# Patient Record
Sex: FEMALE | Employment: UNEMPLOYED | URBAN - METROPOLITAN AREA
[De-identification: names, ages, dates, MRNs, and addresses within clinical notes are randomized per-mention and may not be internally consistent; named-entity substitution may affect disease eponyms.]

---

## 2021-08-25 ENCOUNTER — OFFICE VISIT (OUTPATIENT)
Dept: PRIMARY CARE CLINIC | Age: 21
End: 2021-08-25
Payer: COMMERCIAL

## 2021-08-25 VITALS
OXYGEN SATURATION: 98 % | TEMPERATURE: 97.1 F | DIASTOLIC BLOOD PRESSURE: 78 MMHG | BODY MASS INDEX: 41.91 KG/M2 | HEART RATE: 73 BPM | RESPIRATION RATE: 16 BRPM | SYSTOLIC BLOOD PRESSURE: 117 MMHG | HEIGHT: 66 IN | WEIGHT: 260.8 LBS

## 2021-08-25 DIAGNOSIS — E55.9 VITAMIN D DEFICIENCY: ICD-10-CM

## 2021-08-25 DIAGNOSIS — E04.0 GOITER DIFFUSE: ICD-10-CM

## 2021-08-25 DIAGNOSIS — H91.93 DECREASED HEARING OF BOTH EARS: Primary | ICD-10-CM

## 2021-08-25 DIAGNOSIS — Z11.59 NEED FOR HEPATITIS C SCREENING TEST: ICD-10-CM

## 2021-08-25 DIAGNOSIS — Z00.00 PHYSICAL EXAM: ICD-10-CM

## 2021-08-25 DIAGNOSIS — E66.01 MORBIDLY OBESE (HCC): ICD-10-CM

## 2021-08-25 LAB
25(OH)D3 SERPL-MCNC: 39 NG/ML (ref 30–100)
ALBUMIN SERPL-MCNC: 4 G/DL (ref 3.5–5)
ALBUMIN/GLOB SERPL: 1.2 {RATIO} (ref 1.1–2.2)
ALP SERPL-CCNC: 77 U/L (ref 45–117)
ALT SERPL-CCNC: 21 U/L (ref 12–78)
ANION GAP SERPL CALC-SCNC: 2 MMOL/L (ref 5–15)
AST SERPL-CCNC: 12 U/L (ref 15–37)
BILIRUB SERPL-MCNC: 1 MG/DL (ref 0.2–1)
BUN SERPL-MCNC: 11 MG/DL (ref 6–20)
BUN/CREAT SERPL: 17 (ref 12–20)
CALCIUM SERPL-MCNC: 9.3 MG/DL (ref 8.5–10.1)
CHLORIDE SERPL-SCNC: 108 MMOL/L (ref 97–108)
CHOLEST SERPL-MCNC: 108 MG/DL
CO2 SERPL-SCNC: 29 MMOL/L (ref 21–32)
CREAT SERPL-MCNC: 0.65 MG/DL (ref 0.55–1.02)
ERYTHROCYTE [DISTWIDTH] IN BLOOD BY AUTOMATED COUNT: 12.2 % (ref 11.5–14.5)
GLOBULIN SER CALC-MCNC: 3.4 G/DL (ref 2–4)
GLUCOSE SERPL-MCNC: 89 MG/DL (ref 65–100)
HCT VFR BLD AUTO: 37.4 % (ref 35–47)
HCV AB SERPL QL IA: NONREACTIVE
HDLC SERPL-MCNC: 59 MG/DL
HDLC SERPL: 1.8 {RATIO} (ref 0–5)
HGB BLD-MCNC: 12.4 G/DL (ref 11.5–16)
LDLC SERPL CALC-MCNC: 43.2 MG/DL (ref 0–100)
MCH RBC QN AUTO: 30.7 PG (ref 26–34)
MCHC RBC AUTO-ENTMCNC: 33.2 G/DL (ref 30–36.5)
MCV RBC AUTO: 92.6 FL (ref 80–99)
NRBC # BLD: 0 K/UL (ref 0–0.01)
NRBC BLD-RTO: 0 PER 100 WBC
PLATELET # BLD AUTO: 210 K/UL (ref 150–400)
PMV BLD AUTO: 11.5 FL (ref 8.9–12.9)
POTASSIUM SERPL-SCNC: 4.2 MMOL/L (ref 3.5–5.1)
PROT SERPL-MCNC: 7.4 G/DL (ref 6.4–8.2)
RBC # BLD AUTO: 4.04 M/UL (ref 3.8–5.2)
SODIUM SERPL-SCNC: 139 MMOL/L (ref 136–145)
TRIGL SERPL-MCNC: 29 MG/DL (ref ?–150)
TSH SERPL DL<=0.05 MIU/L-ACNC: 0.47 UIU/ML (ref 0.36–3.74)
VLDLC SERPL CALC-MCNC: 5.8 MG/DL
WBC # BLD AUTO: 5.1 K/UL (ref 3.6–11)

## 2021-08-25 PROCEDURE — 99204 OFFICE O/P NEW MOD 45 MIN: CPT | Performed by: INTERNAL MEDICINE

## 2021-08-25 RX ORDER — ASCORBIC ACID 250 MG
TABLET ORAL
COMMUNITY

## 2021-08-25 RX ORDER — GLUCOSAMINE SULFATE 1500 MG
POWDER IN PACKET (EA) ORAL DAILY
COMMUNITY

## 2021-08-25 RX ORDER — ZINC GLUCONATE 10 MG
LOZENGE ORAL
COMMUNITY

## 2021-08-25 NOTE — PROGRESS NOTES
Identified pt with two pt identifiers(name and ). Chief Complaint   Patient presents with   [address] Other     wants labs to check thyoid; has previous diagnosis of abnormal thyroid labs approx. 1-2 years ago        3 most recent PHQ Screens 2021   Little interest or pleasure in doing things Not at all   Feeling down, depressed, irritable, or hopeless Not at all   Total Score PHQ 2 0        Vitals:    21 1033   BP: 117/78   Pulse: 73   Resp: 16   Temp: 97.1 °F (36.2 °C)   TempSrc: Temporal   SpO2: 98%   Weight: 260 lb 12.8 oz (118.3 kg)   Height: 5' 6\" (1.676 m)   PainSc:   0 - No pain   LMP: 2021       Health Maintenance Due   Topic    Hepatitis C Screening     HPV Age 9Y-34Y (1 - 2-dose series)    COVID-19 Vaccine (1)       1. Have you been to the ER, urgent care clinic since your last visit? Hospitalized since your last visit? No    2. Have you seen or consulted any other health care providers outside of the [address] [place] [address] since your last visit? Include any pap smears or colon screening.  No

## 2021-08-25 NOTE — PROGRESS NOTES
Patito Ramirez (: 2000) is a 22 y.o. female, new patient, here for evaluation of the following chief complaint(s):  Establish Care, Other (wants labs to check thyoid; has previous diagnosis of abnormal thyroid labs approx. 1-2 years ago), and Ear Fullness (no pain, feeling full for about 1 year)     Written by Juan Bueno, as dictated by Dr. Mohsen Mejia MD.      ASSESSMENT/PLAN:  Below is the assessment and plan developed based on review of pertinent history, physical exam, labs, studies, and medications. 1. Decreased hearing of both ears  Referred to ENT. -     REFERRAL TO ENT-OTOLARYNGOLOGY    2. Goiter diffuse  Ordered a thyroid ultrasound to be completed. Waiting for results. -     US THYROID/PARATHYROID/SOFT TISS; Future    3. Physical exam  Complete physical done today. Routine lab work ordered. Waiting for results. -     METABOLIC PANEL, COMPREHENSIVE; Future  -     CBC W/O DIFF; Future  -     LIPID PANEL; Future  -     TSH 3RD GENERATION; Future    4. Morbidly obese ([address]  Advised pt to purchase a FitBit or similar device. Discussed that a healthy lifestyle requires 5,000-7,000 steps daily, but weight loss requires 10,000 steps daily. 5. Need for hepatitis C screening test  Ordered Hepatitis C test. Waiting for results.  -     HEPATITIS C AB; Future    6. Vitamin D deficiency  Ordered lab work to check Vitamin D levels. Waiting for results. Recommend that patient take a Vitamin D supplement of 1,000 units daily. -     VITAMIN D, 25 HYDROXY; Future    SUBJECTIVE/OBJECTIVE:  HPI     Patient presents today to establish care and requesting a complete physical exam. She was previously followed by Dr. Jose Carias, Internal Medicine. She has a dx'd of goiter neck. She has completed a thyroid ultrasound in the past but not recently. She c/o BL ear fullness and hearing loss. Her weight has changed from 233 lbs in 2016 to 260 lbs today. Her menstrual cycle is regular. She has mild back pain during her cycle but denies any headaches. She notes occasional constipation. She denies any seasonal allergies but notes a mild food allergy to shrimp. Patient Active Problem List   Diagnosis Code    Bad breath R19.6    BMI (body mass index), pediatric, greater than or equal to 95% for age Z71.50        Current Outpatient Medications on File Prior to Visit   Medication Sig Dispense Refill    magnesium 250 mg tab Take  by mouth.  ascorbic acid, vitamin C, (Vitamin C) 250 mg tablet Take  by mouth.  cholecalciferol (Vitamin D3) 25 mcg (1,000 unit) cap Take  by mouth daily.  ZINC ACETATE PO Take  by mouth.  fluticasone (FLONASE) 50 mcg/actuation nasal spray 2 Sprays by Both Nostrils route daily. (Patient not taking: Reported on 8/25/2021) 1 Bottle 2    loratadine (CLARITIN) 10 mg tablet Take 1 Tab by mouth daily. (Patient not taking: Reported on 8/25/2021) 30 Tab 2    benzoyl peroxide-erythromycin (BENZAMYCIN) 3-5 % topical gel Apply  to affected area two (2) times a day. (Patient not taking: Reported on 8/25/2021) 46.6 g 5     No current facility-administered medications on file prior to visit. Allergies   Allergen Reactions    Shrimp Nausea Only       Past Medical History:   Diagnosis Date    BMI (body mass index), pediatric, greater than or equal to 95% for age 8/30/2013    Rash 2/22/2011       History reviewed. No pertinent surgical history. History reviewed. No pertinent family history.     Social History     Socioeconomic History    Marital status: SINGLE     Spouse name: Not on file    Number of children: Not on file    Years of education: Not on file    Highest education level: Not on file   Occupational History    Not on file   Tobacco Use    Smoking status: Never Smoker    Smokeless tobacco: Never Used   Vaping Use    Vaping Use: Never used   Substance and Sexual Activity    Alcohol use: No    Drug use: No    Sexual activity: Not on file   Other Topics Concern    Not on file   Social History Narrative    Not on file     Social Determinants of Health     Financial Resource Strain:     Difficulty of Paying Living Expenses:    Food Insecurity:     Worried About Running Out of Food in the Last Year:     [address] in the Last Year:    Transportation Needs:     Lack of Transportation (Medical):  Lack of Transportation (Non-Medical):    Physical Activity:     Days of Exercise per Week:     Minutes of Exercise per Session:    Stress:     Feeling of Stress :    Social Connections:     Frequency of Communication with Friends and Family:     Frequency of Social Gatherings with Friends and Family:     Attends Baptism Services:     Active Member of Clubs or Organizations:     Attends Club or Organization Meetings:     Marital Status:    Intimate Partner Violence:     Fear of Current or Ex-Partner:     Emotionally Abused:     Physically Abused:     Sexually Abused:        No visits with results within 3 Month(s) from this visit. Latest known visit with results is:   Office Visit on 08/30/2013   Component Date Value Ref Range Status    WBC 08/30/2013 4.8  4.0 - 9.1 x10E3/uL Final    Comment: **Effective September 9, 2013, the reference intervals for CBC:WBC,**                             Differential Parameters (Neutrophil %, Neutrophil Absolute,                             Lymphocyte %, Lymphocyte Absolute, Monocyte %, Monocyte Absolute,                             Eosinophil %, Eosinophil Absolute), and Platelet Counts will be                             adjusted to maintain consistency with the distribution of these                             values in the reference population.     RBC 08/30/2013 4.12  3.91 - 5.45 x10E6/uL Final    HGB 08/30/2013 12.4  11.7 - 15.7 g/dL Final    HCT 08/30/2013 37.5  34.8 - 45.8 % Final    MCV 08/30/2013 91  77 - 91 fL Final    MCH 08/30/2013 30.1  25.7 - 31.5 pg Final    [facility] 08/30/2013 33.1  31.7 - 36.0 g/dL Final    RDW 08/30/2013 12.7  12.3 - 15.1 % Final    PLATELET 27/69/6929 716  150 - 349 x10E3/uL Final    Comment: **Effective September 9, 2013, the reference intervals for CBC:WBC,**                             Differential Parameters (Neutrophil %, Neutrophil Absolute,                             Lymphocyte %, Lymphocyte Absolute, Monocyte %, Monocyte Absolute,                             Eosinophil %, Eosinophil Absolute), and Platelet Counts will be                             adjusted to maintain consistency with the distribution of these                             values in the reference population.  NEUTROPHILS 08/30/2013 50  40 - 70 % Final    Lymphocytes 08/30/2013 43  20 - 47 % Final    MONOCYTES 08/30/2013 6  3 - 10 % Final    EOSINOPHILS 08/30/2013 1  0 - 4 % Final    BASOPHILS 08/30/2013 0  0 - 2 % Final    ABS. NEUTROPHILS 08/30/2013 2.4  1.5 - 5.6 x10E3/uL Final    Abs Lymphocytes 08/30/2013 2.0  1.1 - 3.1 x10E3/uL Final    ABS. MONOCYTES 08/30/2013 0.3  0.1 - 0.7 x10E3/uL Final    ABS. EOSINOPHILS 08/30/2013 0.1  0.0 - 0.4 x10E3/uL Final    ABS. BASOPHILS 08/30/2013 0.0  0.0 - 0.3 x10E3/uL Final    IMMATURE GRANULOCYTES 08/30/2013 0  0 - 2 % Final    ABS. IMM. GRANS. 08/30/2013 0.0  0.0 - 0.1 x10E3/uL Final    Cholesterol, total 08/30/2013 102  100 - 169 mg/dL Final    Triglyceride 08/30/2013 40  0 - 89 mg/dL Final    HDL Cholesterol 08/30/2013 51  >39 mg/dL Final    Comment: According to ATP-III Guidelines, HDL-C >59 mg/dL is considered a                           negative risk factor for CHD.     VLDL, calculated 08/30/2013 8  5 - 40 mg/dL Final    LDL, calculated 08/30/2013 43  0 - 109 mg/dL Final    Glucose 08/30/2013 78  65 - 99 mg/dL Final    BUN 08/30/2013 6  5 - 18 mg/dL Final    Creatinine 08/30/2013 0.49  0.42 - 0.75 mg/dL Final    BUN/Creatinine ratio 08/30/2013 12  9 - 25 Final    Sodium 08/30/2013 139  134 - 144 mmol/L Final  Potassium 08/30/2013 3.7  3.5 - 5.2 mmol/L Final    Chloride 08/30/2013 103  97 - 108 mmol/L Final    CO2 08/30/2013 21  17 - 26 mmol/L Final    Calcium 08/30/2013 9.7  8.9 - 10.4 mg/dL Final    Protein, total 08/30/2013 6.7  6.0 - 8.5 g/dL Final    Albumin 08/30/2013 4.0  3.5 - 5.5 g/dL Final    GLOBULIN, TOTAL 08/30/2013 2.7  1.5 - 4.5 g/dL Final    A-G Ratio 08/30/2013 1.5  1.1 - 2.5 Final    Bilirubin, total 08/30/2013 1.0  0.0 - 1.2 mg/dL Final    Alk. phosphatase 08/30/2013 241  134 - 349 IU/L Final    AST (SGOT) 08/30/2013 20  0 - 40 IU/L Final    ALT (SGPT) 08/30/2013 16  0 - 24 IU/L Final    Hemoglobin A1c 08/30/2013 5.3  4.8 - 5.6 % Final    Comment:          Increased risk for diabetes: 5.7 - 6.4                                    Diabetes: >6.4                                    Glycemic control for adults with diabetes: <7.0    T4, Free 08/30/2013 1.10  0.93 - 1.60 ng/dL Final    TSH 08/30/2013 0.848  0.450 - 4.500 uIU/mL Final    Comment: Performed At: [address]  [id number]                           Kmi Ohara MD                           [phone]       Review of Systems   Constitutional: Negative for activity change, fatigue and unexpected weight change. HENT: Positive for congestion (BL ear fullness) and hearing loss. Negative for rhinorrhea and sore throat. Eyes: Negative for discharge. Respiratory: Negative for cough, chest tightness and shortness of breath. Cardiovascular: Negative for leg swelling. Gastrointestinal: Positive for constipation. Negative for abdominal pain and diarrhea. Genitourinary: Negative for dysuria, flank pain, frequency and urgency. Musculoskeletal: Positive for back pain. Negative for arthralgias and myalgias. Skin: Negative for color change and rash. Allergic/Immunologic: Positive for food allergies. Neurological: Negative for dizziness, light-headedness and headaches. Psychiatric/Behavioral: Negative for dysphoric mood and sleep disturbance. The patient is not nervous/anxious. Visit Vitals  /78 (BP 1 Location: Left upper arm, BP Patient Position: Sitting, BP Cuff Size: Adult long)   Pulse 73   Temp 97.1 °F (36.2 °C) (Temporal)   Resp 16   Ht 5' 6\" (1.676 m)   Wt 260 lb 12.8 oz (118.3 kg)   LMP 08/09/2021 (Exact Date)   SpO2 98%   BMI 42.09 kg/m²       Physical Exam  Vitals and nursing note reviewed. Constitutional:       General: She is not in acute distress. Appearance: Normal appearance. She is normal weight. She is not diaphoretic. HENT:      Right Ear: Tympanic membrane, ear canal and external ear normal.      Left Ear: Tympanic membrane, ear canal and external ear normal.      Mouth/Throat:      Mouth: Mucous membranes are moist.      Pharynx: Oropharynx is clear. Eyes:      General:         Right eye: No discharge. Left eye: No discharge. Extraocular Movements: Extraocular movements intact. Conjunctiva/sclera: Conjunctivae normal.   Neck:      Thyroid: Thyromegaly present. Comments: Goiter neck  Cardiovascular:      Rate and Rhythm: Normal rate and regular rhythm. Pulses: Normal pulses. Dorsalis pedis pulses are 2+ on the right side and 2+ on the left side. Pulmonary:      Effort: Pulmonary effort is normal.      Breath sounds: Normal breath sounds. No wheezing. Abdominal:      General: Bowel sounds are normal. There is no distension. Palpations: Abdomen is soft. Tenderness: There is no abdominal tenderness. Musculoskeletal:      Right lower leg: No edema. Left lower leg: No edema. Comments: B/L knees without crepitus   Lymphadenopathy:      Cervical: No cervical adenopathy.    Neurological:      Deep Tendon Reflexes:      Reflex Scores:       Patellar reflexes are 2+ on the right side and 2+ on the left side.  Psychiatric:         Mood and Affect: Mood and affect normal.             An electronic signature was used to authenticate this note.   -- Elen Leahy

## 2021-08-26 ENCOUNTER — HOSPITAL ENCOUNTER (OUTPATIENT)
Dept: ULTRASOUND IMAGING | Age: 21
Discharge: HOME | End: 2021-08-26
Attending: INTERNAL MEDICINE
Payer: COMMERCIAL

## 2021-08-26 DIAGNOSIS — E04.0 GOITER DIFFUSE: ICD-10-CM

## 2021-08-26 PROCEDURE — 76536 US EXAM OF HEAD AND NECK: CPT

## 2021-08-30 DIAGNOSIS — E04.2 MULTIPLE THYROID NODULES: Primary | ICD-10-CM

## 2021-08-30 NOTE — PROGRESS NOTES
Results reviewed. Release via [facility], here is the number of ENT.   Elle Fountain, [address]  [address] [facility] [address]     Phone:  [phone]

## 2022-01-05 ENCOUNTER — TELEPHONE (OUTPATIENT)
Dept: PRIMARY CARE CLINIC | Age: 22
End: 2022-01-05

## 2022-01-05 NOTE — TELEPHONE ENCOUNTER
Spoke with patient's mom.  She wanted name and number for the referral she was give back in July 2021

## 2022-01-05 NOTE — TELEPHONE ENCOUNTER
----- Message from [facility] sent at 1/5/2022 10:53 AM EST -----  Subject: Message to Provider    QUESTIONS  Information for Provider? Would like a call to discuss next steps for her   treatment   ---------------------------------------------------------------------------  --------------  CALL BACK INFO  What is the best way for the office to contact you? OK to leave message on   voicemail  Preferred Call Back Phone Number? [phone]  ---------------------------------------------------------------------------  --------------  SCRIPT ANSWERS  Relationship to Patient? Parent  Representative Name? Alisia  Patient is under 25 and the Parent has custody? No  Is the Representative on the appropriate HIPAA document in Epic?  Yes

## 2022-01-26 ENCOUNTER — TRANSCRIBE ORDER (OUTPATIENT)
Dept: SCHEDULING | Age: 22
End: 2022-01-26

## 2022-01-26 DIAGNOSIS — R22.1 NECK MASS: Primary | ICD-10-CM

## 2023-05-08 ENCOUNTER — TELEPHONE (OUTPATIENT)
Dept: PRIMARY CARE CLINIC | Facility: CLINIC | Age: 23
End: 2023-05-08

## 2023-05-08 NOTE — TELEPHONE ENCOUNTER
Called and spoke with the patient at [phone]. Patient stated that she is asking for Dr Claudine Evangelista to do a letter so that she can get her financial aid/money back in regards to her classes. Stated she isn't doing that great this semester from [employer] work and yeah. Zhanna Espinoza \" I told her that we do not have and Dx of concentration issues. No studies on file for evaluation. Patient would need to have an appointment for the letter however I did state that I don't thing Dr Claudine Evangelista will do this as this needs eval for psych if never been dx before. Cannot see patient this week for this as of right now.  Told her I will forward the message along and let her know

## 2023-05-08 NOTE — TELEPHONE ENCOUNTER
Called and spoke with both patient and mother, she needs to have an appointment for this. Scheduled for Monday 5/15. Patient needing to be in office, not seen since 2021. Told them both that we do not have documentation of having concentration problems and that for a letter the patient will need to be seen.

## 2023-05-22 RX ORDER — FLUTICASONE PROPIONATE 50 MCG
2 SPRAY, SUSPENSION (ML) NASAL DAILY
COMMUNITY
Start: 2016-09-28

## 2023-05-22 RX ORDER — LORATADINE 10 MG/1
10 TABLET ORAL DAILY
COMMUNITY
Start: 2016-09-28

## 2023-05-22 RX ORDER — ERYTHROMYCIN AND BENZOYL PEROXIDE 30; 50 MG/G; MG/G
GEL TOPICAL 2 TIMES DAILY
COMMUNITY
Start: 2013-08-30

## 2023-05-22 RX ORDER — ASCORBIC ACID 250 MG
TABLET ORAL
COMMUNITY

## 2023-05-31 ENCOUNTER — OFFICE VISIT (OUTPATIENT)
Dept: PRIMARY CARE CLINIC | Facility: CLINIC | Age: 23
End: 2023-05-31
Payer: COMMERCIAL

## 2023-05-31 VITALS
HEIGHT: 66 IN | SYSTOLIC BLOOD PRESSURE: 106 MMHG | BODY MASS INDEX: 39.86 KG/M2 | HEART RATE: 70 BPM | RESPIRATION RATE: 18 BRPM | OXYGEN SATURATION: 95 % | TEMPERATURE: 97.1 F | WEIGHT: 248 LBS | DIASTOLIC BLOOD PRESSURE: 67 MMHG

## 2023-05-31 DIAGNOSIS — R53.83 OTHER FATIGUE: ICD-10-CM

## 2023-05-31 DIAGNOSIS — E04.1 THYROID NODULE: Primary | ICD-10-CM

## 2023-05-31 PROCEDURE — 99213 OFFICE O/P EST LOW 20 MIN: CPT | Performed by: INTERNAL MEDICINE

## 2023-05-31 SDOH — ECONOMIC STABILITY: HOUSING INSECURITY
IN THE LAST 12 MONTHS, WAS THERE A TIME WHEN YOU DID NOT HAVE A STEADY PLACE TO SLEEP OR SLEPT IN A SHELTER (INCLUDING NOW)?: NO

## 2023-05-31 SDOH — ECONOMIC STABILITY: FOOD INSECURITY: WITHIN THE PAST 12 MONTHS, YOU WORRIED THAT YOUR FOOD WOULD RUN OUT BEFORE YOU GOT MONEY TO BUY MORE.: PATIENT DECLINED

## 2023-05-31 SDOH — ECONOMIC STABILITY: INCOME INSECURITY: HOW HARD IS IT FOR YOU TO PAY FOR THE VERY BASICS LIKE FOOD, HOUSING, MEDICAL CARE, AND HEATING?: PATIENT DECLINED

## 2023-05-31 SDOH — ECONOMIC STABILITY: FOOD INSECURITY: WITHIN THE PAST 12 MONTHS, THE FOOD YOU BOUGHT JUST DIDN'T LAST AND YOU DIDN'T HAVE MONEY TO GET MORE.: PATIENT DECLINED

## 2023-05-31 ASSESSMENT — PATIENT HEALTH QUESTIONNAIRE - PHQ9
SUM OF ALL RESPONSES TO PHQ QUESTIONS 1-9: 0
2. FEELING DOWN, DEPRESSED OR HOPELESS: 0
SUM OF ALL RESPONSES TO PHQ9 QUESTIONS 1 & 2: 0
SUM OF ALL RESPONSES TO PHQ QUESTIONS 1-9: 0
1. LITTLE INTEREST OR PLEASURE IN DOING THINGS: 0
SUM OF ALL RESPONSES TO PHQ QUESTIONS 1-9: 0
SUM OF ALL RESPONSES TO PHQ QUESTIONS 1-9: 0

## 2023-05-31 ASSESSMENT — ENCOUNTER SYMPTOMS
CHEST TIGHTNESS: 0
SHORTNESS OF BREATH: 0
ABDOMINAL PAIN: 0
SORE THROAT: 0
BACK PAIN: 0
COUGH: 0
DIARRHEA: 0
RHINORRHEA: 0
CONSTIPATION: 0
EYE DISCHARGE: 0
COLOR CHANGE: 0

## 2023-05-31 NOTE — PROGRESS NOTES
1. \"Have you been to the ER, urgent care clinic since your last visit? Hospitalized since your last visit? \" No    2. \"Have you seen or consulted any other health care providers outside of the [address] [place] [address] since your last visit? \" Yes When: ENDO      3. For patients aged 39-70: Has the patient had a colonoscopy / FIT/ Cologuard? NA - based on age      If the patient is female:    4. For patients aged 41-77: Has the patient had a mammogram within the past 2 years? NA - based on age or sex      11. For patients aged 21-65: Has the patient had a pap smear? No     .  Chief Complaint   Patient presents with    Follow-up    Letter for School/Work     Wants a letter for her school stating all the medical treatment she has been getting since Aug-Dec 2022 she was going to a lot of doctors one of them being an Endocrinologist that Dr. Jacek Daniels sent her too. She was having symptoms of thyroid and had a biopsy done. Pt said everything is normal now with thyroid. Her last visit with Dr. Jacek Daniels is 8/25/2021. Pt is ok with scribe.
Cervical back: Normal range of motion and neck supple. Lymphadenopathy:      Cervical: No cervical adenopathy. Neurological:      Mental Status: She is alert and oriented to person, place, and time. Psychiatric:         Mood and Affect: Mood normal.         I, Krissy Silver MD, personally performed the services described in this documentation as scribed by Rosette Del Cid in my presence, and it is both accurate and complete. An electronic signature was used to authenticate this note.   -- Rosette Del Cid

## 2023-06-01 ENCOUNTER — HOSPITAL ENCOUNTER (OUTPATIENT)
Age: 23
Discharge: HOME | End: 2023-06-01
Payer: COMMERCIAL

## 2023-06-01 DIAGNOSIS — E04.1 THYROID NODULE: ICD-10-CM

## 2023-06-01 PROCEDURE — 76536 US EXAM OF HEAD AND NECK: CPT

## 2024-08-27 ENCOUNTER — OFFICE VISIT (OUTPATIENT)
Dept: PRIMARY CARE CLINIC | Facility: CLINIC | Age: 24
End: 2024-08-27
Payer: COMMERCIAL

## 2024-08-27 VITALS
HEART RATE: 73 BPM | DIASTOLIC BLOOD PRESSURE: 71 MMHG | OXYGEN SATURATION: 98 % | TEMPERATURE: 97 F | SYSTOLIC BLOOD PRESSURE: 109 MMHG | HEIGHT: 66 IN | RESPIRATION RATE: 18 BRPM | BODY MASS INDEX: 39.37 KG/M2 | WEIGHT: 245 LBS

## 2024-08-27 DIAGNOSIS — B37.0 ORAL THRUSH: ICD-10-CM

## 2024-08-27 DIAGNOSIS — E04.1 THYROID NODULE: Primary | ICD-10-CM

## 2024-08-27 DIAGNOSIS — E04.1 THYROID NODULE: ICD-10-CM

## 2024-08-27 DIAGNOSIS — Z00.00 PHYSICAL EXAM: ICD-10-CM

## 2024-08-27 DIAGNOSIS — Z11.4 ENCOUNTER FOR SCREENING FOR HIV: ICD-10-CM

## 2024-08-27 PROCEDURE — 99214 OFFICE O/P EST MOD 30 MIN: CPT | Performed by: INTERNAL MEDICINE

## 2024-08-27 RX ORDER — DOXYCYCLINE 100 MG/1
100 CAPSULE ORAL DAILY
COMMUNITY
Start: 2024-08-21

## 2024-08-27 RX ORDER — NYSTATIN 100000/ML
500000 SUSPENSION, ORAL (FINAL DOSE FORM) ORAL 3 TIMES DAILY
Qty: 150 ML | Refills: 0 | Status: SHIPPED | OUTPATIENT
Start: 2024-08-27 | End: 2024-09-06

## 2024-08-27 SDOH — ECONOMIC STABILITY: FOOD INSECURITY: WITHIN THE PAST 12 MONTHS, YOU WORRIED THAT YOUR FOOD WOULD RUN OUT BEFORE YOU GOT MONEY TO BUY MORE.: NEVER TRUE

## 2024-08-27 SDOH — ECONOMIC STABILITY: FOOD INSECURITY: WITHIN THE PAST 12 MONTHS, THE FOOD YOU BOUGHT JUST DIDN'T LAST AND YOU DIDN'T HAVE MONEY TO GET MORE.: NEVER TRUE

## 2024-08-27 SDOH — ECONOMIC STABILITY: INCOME INSECURITY: HOW HARD IS IT FOR YOU TO PAY FOR THE VERY BASICS LIKE FOOD, HOUSING, MEDICAL CARE, AND HEATING?: SOMEWHAT HARD

## 2024-08-27 ASSESSMENT — PATIENT HEALTH QUESTIONNAIRE - PHQ9
SUM OF ALL RESPONSES TO PHQ QUESTIONS 1-9: 0
1. LITTLE INTEREST OR PLEASURE IN DOING THINGS: NOT AT ALL
2. FEELING DOWN, DEPRESSED OR HOPELESS: NOT AT ALL
SUM OF ALL RESPONSES TO PHQ9 QUESTIONS 1 & 2: 0
SUM OF ALL RESPONSES TO PHQ QUESTIONS 1-9: 0

## 2024-08-27 ASSESSMENT — ENCOUNTER SYMPTOMS
RHINORRHEA: 0
COUGH: 0
DIARRHEA: 0
EYE DISCHARGE: 0
SORE THROAT: 0
CHEST TIGHTNESS: 0
ABDOMINAL PAIN: 0
SHORTNESS OF BREATH: 0
BACK PAIN: 0
COLOR CHANGE: 0
CONSTIPATION: 0

## 2024-08-27 NOTE — PROGRESS NOTES
Running Out of Food in the Last Year: Never true     Ran Out of Food in the Last Year: Never true   Transportation Needs: Unknown (8/27/2024)    PRAPARE - Transportation     Lack of Transportation (Medical): Not on file     Lack of Transportation (Non-Medical): No   Physical Activity: Not on file   Stress: Not on file   Social Connections: Not on file   Intimate Partner Violence: Not on file   Housing Stability: Unknown (8/27/2024)    Housing Stability Vital Sign     Unable to Pay for Housing in the Last Year: Not on file     Number of Times Moved in the Last Year: Not on file     Homeless in the Last Year: No       No visits with results within 3 Month(s) from this visit.   Latest known visit with results is:   Office Visit on 08/25/2021   Component Date Value Ref Range Status    Vit D, 25-Hydroxy 08/25/2021 39.0  30 - 100 ng/mL Final    Comment: (NOTE)  Deficiency               <20 ng/mL  Insufficiency          20-30 ng/mL  Sufficient             ng/mL  Possible toxicity       >100 ng/mL    The Method used is Siemens Advia Centaur currently standardized to a   Center of Disease Control and Prevention (CDC) certified reference   method. Samples containing fluorescein dye can produce falsely   elevated values when tested with the ADVIA Centaur Vitamin D Assay.   It is recommended that results in the toxic range, >100 ng/mL, be   retested 72 hours post fluorescein exposure.      WBC 08/25/2021 5.1  3.6 - 11.0 K/uL Final    RBC 08/25/2021 4.04  3.80 - 5.20 M/uL Final    Hemoglobin 08/25/2021 12.4  11.5 - 16.0 g/dL Final    Hematocrit 08/25/2021 37.4  35.0 - 47.0 % Final    MCV 08/25/2021 92.6  80.0 - 99.0 FL Final    MCH 08/25/2021 30.7  26.0 - 34.0 PG Final    MCHC 08/25/2021 33.2  30.0 - 36.5 g/dL Final    RDW 08/25/2021 12.2  11.5 - 14.5 % Final    Platelets 08/25/2021 210  150 - 400 K/uL Final    MPV 08/25/2021 11.5  8.9 - 12.9 FL Final    Nucleated RBCs 08/25/2021 0.0  0  WBC Final    NRBC Absolute  borderline high,  200-499 mg/dL high and  greater than or equal to 500  mg/dL very high.      HDL 08/25/2021 59  MG/DL Final    Comment: Based on NCEP ATP III, HDL Cholesterol <40 mg/dL is considered low and >60  mg/dL is elevated.      LDL Calculated 08/25/2021 43.2  0 - 100 MG/DL Final    Comment: Based on the NCEP-ATP: LDL-C concentrations are considered  optimal <100 mg/dL,  near optimal/above Normal 100-129 mg/dL Borderline High: 130-159, High: 160-189  mg/dL Very High: Greater than or equal to 190 mg/dL      VLDL Cholesterol Calculated 08/25/2021 5.8  MG/DL Final    Chol/HDL Ratio 08/25/2021 1.8  0.0 - 5.0   Final    Hepatitis C Ab 08/25/2021 NONREACTIVE  NONREACTIVE   Final    Method used is Siemens Advia Centaur         Review of Systems   Constitutional:  Negative for activity change, fatigue and unexpected weight change.   HENT:  Negative for congestion, hearing loss, rhinorrhea and sore throat.    Eyes:  Negative for discharge.   Respiratory:  Negative for cough, chest tightness and shortness of breath.    Gastrointestinal:  Negative for abdominal pain, constipation and diarrhea.   Genitourinary:  Negative for dysuria, flank pain, frequency and urgency.   Musculoskeletal:  Negative for arthralgias, back pain and myalgias.   Skin:  Negative for color change and rash.   Neurological:  Negative for dizziness, light-headedness and headaches.   Psychiatric/Behavioral:  Negative for dysphoric mood and sleep disturbance. The patient is not nervous/anxious.           /71 (Site: Left Upper Arm, Position: Sitting)   Pulse 73   Temp 97 °F (36.1 °C) (Temporal)   Resp 18   Ht 1.676 m (5' 6\")   Wt 111.1 kg (245 lb)   LMP 08/03/2024   SpO2 98%   BMI 39.54 kg/m²     Physical Exam  Vitals and nursing note reviewed.   Constitutional:       General: She is not in acute distress.     Appearance: Normal appearance. She is obese. She is not diaphoretic.   HENT:      Right Ear: Tympanic membrane, ear canal and

## 2024-08-27 NOTE — PROGRESS NOTES
\"Have you been to the ER, urgent care clinic since your last visit?  Hospitalized since your last visit?\"    No      “Have you seen or consulted any other health care providers outside of [facility] since your last visit?”    Care now       “Have you had a pap smear?”    Has not had one.       Chief Complaint   Patient presents with    Annual Exam       Pt is ok with scribe.

## 2024-08-27 NOTE — PATIENT INSTRUCTIONS
day appointments    [facility] [employer] [employer] ([facility])  What they offer:  Mammograms and PAP smears regardless of ability to pay.  Contact: [phone]        [facility] Health Financial Assistance  What they offer: [facility] provides financial assistance to patients based on their income, assets, and needs. Assistance may also be provided in obtaining free or low-cost health insurance or arranging a manageable payment plan.  Website: https://www.[facility]/locations/[facility]-medical-center/billing-and-insurance/financial-assistance  Assistance application can be downloaded from above website  Financial Counseling Call Center: [phone]          Medications    Good Rx  What they offer: Good Rx tracks prescription drug prices and provides free drug coupons for discounts on medications.  Website: https://www.[employer]/  [employer]  What they offer: [facility] offers free information on medications and healthcare cost savings programs including prescription assistance programs, coupons, and discount programs.  Website: https://www.RingTu.org/  Helpline: 519.123.5090    RX Assist  What they offer: Information about free and low-cost medicine programs.  Website: https://www.rxassist.org/    [employer] $4 Prescription Program  What they offer: Prescription Program includes up to a 30-day supply for $4 and a 90-day supply for $10 of some covered generic drugs at commonly prescribed dosages  Website: https://www.[employer]/cp/4-prescriptions/0352192Qruncikac  CommonHelp  What they offer: Partnership with the [place] Department of . Assist with finding and applying for government funded programs and benefits. You can also update your benefits or report changes through M87.  Website: https://www.[employer].[place].[employer]/  Phone Number: 985-8WAQIVB ([phone])    [employer] Energy[employer]  What they offer: EnergyShare is [employer]’s energy assistance program of last resort for anyone who faces financial hardships  from unemployment or family crisis.  Phone Number: [phone]  Address: [address] [place] [address]  Website: https://www.[employer]/[place]/billing/billing-options/energyshare    Energy Assistance Programs (EAP) - Department    What they offer: EAP assists low-income households in meeting their immediate home energy needs.      Website: https://www.[facility].[place].[facility]/benefit/ea/  Available assistance:   Crisis Assistance - Heating Emergencies  Fuel Assistance - Offset Heating Fuel Costs  Cooling Assistance - Applies to Cooling Utility Bills and Equipment  How to apply:  Online:  https://Fangjia.comvirginiaITI Tech/  Call:  [phone]   Paper application:   Print application from https://www.[employer])[place]iaPonominalu.ruSt. Joseph's Hospital/benefit/ea/ and submit to your [facility] Department of         [facility]  contacts: https://www.[employer].[facility]/localagency/index.cgi    [place] Utility Affordability Programs  https://a.gov/public-utilities/billing  Call:  632.649.6375   Email:  angely@[facility].gov  Programs available:  Equal Monthly Payment Plan, [employer] Heat Program, [employer] Water Program, [employer] Water Conservation Program, Senior Assistance, Other Fuel Assistance Programs, PromisePay Payment Plans, Low-Income Household Water Assistance Program (LIHWAP)    Financial Opportunity Centers:  [employer]   What they offer: free coaching services in the areas of Employment, Financial, and Benefits Access   Phone Number: [phone]   Address: [address] [place] [address]   Website: https://www.DailyPath.org/economic-resource-center/tgsmnfgyf-mzmglqzjsik-fgayjg-[place]/          [facility]   What they offer: free coaching services in the areas of Employment, Financial, and Benefits Access   Phone Number: [phone]   Email: info@nrafe.org   Address: [address],

## 2024-08-28 LAB
ALBUMIN SERPL-MCNC: 3.7 G/DL (ref 3.5–5)
ALBUMIN/GLOB SERPL: 1 (ref 1.1–2.2)
ALP SERPL-CCNC: 69 U/L (ref 45–117)
ALT SERPL-CCNC: 34 U/L (ref 12–78)
ANION GAP SERPL CALC-SCNC: 4 MMOL/L (ref 5–15)
AST SERPL-CCNC: 29 U/L (ref 15–37)
BILIRUB SERPL-MCNC: 0.9 MG/DL (ref 0.2–1)
BUN SERPL-MCNC: 10 MG/DL (ref 6–20)
BUN/CREAT SERPL: 15 (ref 12–20)
CALCIUM SERPL-MCNC: 9.7 MG/DL (ref 8.5–10.1)
CHLORIDE SERPL-SCNC: 107 MMOL/L (ref 97–108)
CHOLEST SERPL-MCNC: 107 MG/DL
CO2 SERPL-SCNC: 28 MMOL/L (ref 21–32)
CREAT SERPL-MCNC: 0.68 MG/DL (ref 0.55–1.02)
ERYTHROCYTE [DISTWIDTH] IN BLOOD BY AUTOMATED COUNT: 12 % (ref 11.5–14.5)
GLOBULIN SER CALC-MCNC: 3.8 G/DL (ref 2–4)
GLUCOSE SERPL-MCNC: 81 MG/DL (ref 65–100)
HCT VFR BLD AUTO: 36.5 % (ref 35–47)
HDLC SERPL-MCNC: 43 MG/DL
HDLC SERPL: 2.5 (ref 0–5)
HGB BLD-MCNC: 12.2 G/DL (ref 11.5–16)
HIV 1+2 AB+HIV1 P24 AG SERPL QL IA: NONREACTIVE
HIV 1/2 RESULT COMMENT: NORMAL
LDLC SERPL CALC-MCNC: 53.8 MG/DL (ref 0–100)
MCH RBC QN AUTO: 30.4 PG (ref 26–34)
MCHC RBC AUTO-ENTMCNC: 33.4 G/DL (ref 30–36.5)
MCV RBC AUTO: 91 FL (ref 80–99)
NRBC # BLD: 0 K/UL (ref 0–0.01)
NRBC BLD-RTO: 0 PER 100 WBC
PLATELET # BLD AUTO: 187 K/UL (ref 150–400)
PMV BLD AUTO: 12.1 FL (ref 8.9–12.9)
POTASSIUM SERPL-SCNC: 3.9 MMOL/L (ref 3.5–5.1)
PROT SERPL-MCNC: 7.5 G/DL (ref 6.4–8.2)
RBC # BLD AUTO: 4.01 M/UL (ref 3.8–5.2)
SODIUM SERPL-SCNC: 139 MMOL/L (ref 136–145)
TRIGL SERPL-MCNC: 51 MG/DL
TSH SERPL DL<=0.05 MIU/L-ACNC: 0.29 UIU/ML (ref 0.36–3.74)
VLDLC SERPL CALC-MCNC: 10.2 MG/DL
WBC # BLD AUTO: 4.7 K/UL (ref 3.6–11)